# Patient Record
Sex: MALE | Race: WHITE | Employment: FULL TIME | URBAN - METROPOLITAN AREA
[De-identification: names, ages, dates, MRNs, and addresses within clinical notes are randomized per-mention and may not be internally consistent; named-entity substitution may affect disease eponyms.]

---

## 2019-04-28 ENCOUNTER — APPOINTMENT (OUTPATIENT)
Dept: GENERAL RADIOLOGY | Age: 39
End: 2019-04-28
Payer: COMMERCIAL

## 2019-04-28 ENCOUNTER — HOSPITAL ENCOUNTER (EMERGENCY)
Age: 39
Discharge: HOME | End: 2019-04-28
Payer: COMMERCIAL

## 2019-04-28 VITALS
RESPIRATION RATE: 18 BRPM | TEMPERATURE: 97.4 F | HEART RATE: 94 BPM | OXYGEN SATURATION: 97 % | DIASTOLIC BLOOD PRESSURE: 87 MMHG | SYSTOLIC BLOOD PRESSURE: 144 MMHG

## 2019-04-28 DIAGNOSIS — S61.002A AVULSION OF SKIN OF LEFT THUMB, INITIAL ENCOUNTER: Primary | ICD-10-CM

## 2019-04-28 PROCEDURE — 90471 IMMUNIZATION ADMIN: CPT | Performed by: NURSE PRACTITIONER

## 2019-04-28 PROCEDURE — 6370000000 HC RX 637 (ALT 250 FOR IP): Performed by: NURSE PRACTITIONER

## 2019-04-28 PROCEDURE — 6360000002 HC RX W HCPCS: Performed by: NURSE PRACTITIONER

## 2019-04-28 PROCEDURE — 73130 X-RAY EXAM OF HAND: CPT

## 2019-04-28 PROCEDURE — 90715 TDAP VACCINE 7 YRS/> IM: CPT | Performed by: NURSE PRACTITIONER

## 2019-04-28 PROCEDURE — 99282 EMERGENCY DEPT VISIT SF MDM: CPT

## 2019-04-28 RX ORDER — DOXYCYCLINE 100 MG/1
100 CAPSULE ORAL 2 TIMES DAILY
Qty: 14 CAPSULE | Refills: 0 | Status: SHIPPED | OUTPATIENT
Start: 2019-04-28 | End: 2019-05-05

## 2019-04-28 RX ORDER — NAPROXEN 500 MG/1
500 TABLET ORAL ONCE
Status: COMPLETED | OUTPATIENT
Start: 2019-04-28 | End: 2019-04-28

## 2019-04-28 RX ORDER — NAPROXEN 500 MG/1
500 TABLET ORAL 2 TIMES DAILY PRN
Qty: 28 TABLET | Refills: 0 | Status: SHIPPED | OUTPATIENT
Start: 2019-04-28

## 2019-04-28 RX ADMIN — TETANUS TOXOID, REDUCED DIPHTHERIA TOXOID AND ACELLULAR PERTUSSIS VACCINE, ADSORBED 0.5 ML: 5; 2.5; 8; 8; 2.5 SUSPENSION INTRAMUSCULAR at 19:52

## 2019-04-28 RX ADMIN — NAPROXEN 500 MG: 500 TABLET ORAL at 19:52

## 2019-04-28 ASSESSMENT — PAIN DESCRIPTION - ORIENTATION: ORIENTATION: LEFT

## 2019-04-28 ASSESSMENT — PAIN SCALES - GENERAL
PAINLEVEL_OUTOF10: 4
PAINLEVEL_OUTOF10: 4

## 2019-04-28 ASSESSMENT — PAIN DESCRIPTION - PAIN TYPE: TYPE: ACUTE PAIN

## 2019-04-28 ASSESSMENT — PAIN DESCRIPTION - LOCATION: LOCATION: HAND

## 2019-04-28 NOTE — ED PROVIDER NOTES
[facility]      HPI:  4/28/19,   Time: 7:22 PM         Serafin Quevedo is a 45 y.o. male presenting to the ED for left thumb laceration, beginning few hours ago. The complaint has been constant, mild in severity, and worsened by nothing. 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 Continues to have oozing from the tip of the thumb. He is uncertain of his tetanus status. He is right-hand dominant. ROS:   Pertinent positives and negatives are stated within HPI, all other systems reviewed and are negative.  --------------------------------------------- PAST HISTORY ---------------------------------------------  Past Medical History:  has no past medical history on file. Past Surgical History:  has no past surgical history on file. Social History:  reports that he has never smoked. He has never used smokeless tobacco. He reports that he drank alcohol. He reports that he does not use drugs. Family History: family history is not on file. The patients home medications have been reviewed. Allergies: Pcn [penicillins]    -------------------------------------------------- RESULTS -------------------------------------------------  All laboratory and radiology results have been personally reviewed by myself   LABS:  No results found for this visit on 04/28/19. RADIOLOGY:  Interpreted by Radiologist.  XR HAND LEFT (MIN 3 VIEWS)    (Results Pending)       ------------------------- NURSING NOTES AND VITALS REVIEWED ---------------------------   The nursing notes within the ED encounter and vital signs as below have been reviewed.    BP (!) 144/87   Pulse 94   Temp 97.4 °F (36.3 °C) (Temporal)   Resp 18   SpO2 97%   Oxygen Saturation Interpretation: Normal      ---------------------------------------------------PHYSICAL EXAM--------------------------------------      Constitutional/General: Alert and oriented x3, well appearing, non toxic in NAD  Head: NC/AT  Eyes: PERRL, EOMI  Mouth: Oropharynx clear, handling secretions, no trismus  Neck: Supple, full ROM, no meningeal signs  Pulmonary: Lungs clear to auscultation bilaterally, no wheezes, rales, or rhonchi. Not in respiratory distress  Cardiovascular:  Regular rate and rhythm, no murmurs, gallops, or rubs. 2+ distal pulses  Abdomen: Soft, non tender, non distended,   Extremities: Moves all extremities x 4. Warm and well perfused, avulsion of the distal tip of the left thumb. Continues to have oozing from the site. Skin: warm and dry without rash  Neurologic: GCS 15,  Psych: Normal Affect      ------------------------------ ED COURSE/MEDICAL DECISION MAKING----------------------  Medications   naproxen (NAPROSYN) tablet 500 mg (500 mg Oral Given 4/28/19 1952)   Tetanus-Diphth-Acell Pertussis (BOOSTRIX) injection 0.5 mL (0.5 mLs Intramuscular Given 4/28/19 1952)         Medical Decision Making:    Quick clot powder was applied to the avulsion of the distal tip of the left thumb and complete hemostasis was obtained. Dressing was applied. There was a splint applied over the tip of the left thumb. The medics and pain medication provided and advised to follow up with PCP if any further problems. Counseling: The emergency provider has spoken with the patient and discussed todays results, in addition to providing specific details for the plan of care and counseling regarding the diagnosis and prognosis. Questions are answered at this time and they are agreeable with the plan.      --------------------------------- IMPRESSION AND DISPOSITION ---------------------------------    IMPRESSION  1.  Avulsion of skin of left thumb, initial encounter        DISPOSITION  Disposition: Discharge to home  Patient condition is good                 Kathy Tipton, SHEBA - CNP  04/28/19 2030

## 2020-07-03 ENCOUNTER — HOSPITAL ENCOUNTER (OUTPATIENT)
Age: 40
Setting detail: OBSERVATION
Discharge: HOME | End: 2020-07-04
Attending: EMERGENCY MEDICINE | Admitting: ORTHOPAEDIC SURGERY
Payer: COMMERCIAL

## 2020-07-03 PROBLEM — S69.80XA: Status: ACTIVE | Noted: 2020-07-03

## 2020-07-03 LAB
ANION GAP SERPL CALCULATED.3IONS-SCNC: 9 MMOL/L (ref 7–16)
BASOPHILS ABSOLUTE: 0.03 E9/L (ref 0–0.2)
BASOPHILS RELATIVE PERCENT: 0.4 % (ref 0–2)
BUN BLDV-MCNC: 10 MG/DL (ref 6–20)
CALCIUM SERPL-MCNC: 8.4 MG/DL (ref 8.6–10.2)
CHLORIDE BLD-SCNC: 108 MMOL/L (ref 98–107)
CO2: 25 MMOL/L (ref 22–29)
CREAT SERPL-MCNC: 0.9 MG/DL (ref 0.7–1.2)
EOSINOPHILS ABSOLUTE: 0.26 E9/L (ref 0.05–0.5)
EOSINOPHILS RELATIVE PERCENT: 3.3 % (ref 0–6)
GFR AFRICAN AMERICAN: >60
GFR NON-AFRICAN AMERICAN: >60 ML/MIN/1.73
GLUCOSE BLD-MCNC: 102 MG/DL (ref 74–99)
HCT VFR BLD CALC: 44.3 % (ref 37–54)
HEMOGLOBIN: 14.7 G/DL (ref 12.5–16.5)
IMMATURE GRANULOCYTES #: 0.03 E9/L
IMMATURE GRANULOCYTES %: 0.4 % (ref 0–5)
INR BLD: 1
LYMPHOCYTES ABSOLUTE: 1.55 E9/L (ref 1.5–4)
LYMPHOCYTES RELATIVE PERCENT: 19.8 % (ref 20–42)
MCH RBC QN AUTO: 28.9 PG (ref 26–35)
MCHC RBC AUTO-ENTMCNC: 33.2 % (ref 32–34.5)
MCV RBC AUTO: 87 FL (ref 80–99.9)
MONOCYTES ABSOLUTE: 0.69 E9/L (ref 0.1–0.95)
MONOCYTES RELATIVE PERCENT: 8.8 % (ref 2–12)
NEUTROPHILS ABSOLUTE: 5.26 E9/L (ref 1.8–7.3)
NEUTROPHILS RELATIVE PERCENT: 67.3 % (ref 43–80)
PDW BLD-RTO: 13 FL (ref 11.5–15)
PLATELET # BLD: 192 E9/L (ref 130–450)
PMV BLD AUTO: 9.4 FL (ref 7–12)
POTASSIUM REFLEX MAGNESIUM: 3.8 MMOL/L (ref 3.5–5)
PROTHROMBIN TIME: 11 SEC (ref 9.3–12.4)
RBC # BLD: 5.09 E12/L (ref 3.8–5.8)
SODIUM BLD-SCNC: 142 MMOL/L (ref 132–146)
WBC # BLD: 7.8 E9/L (ref 4.5–11.5)

## 2020-07-03 PROCEDURE — 85610 PROTHROMBIN TIME: CPT

## 2020-07-03 PROCEDURE — G0378 HOSPITAL OBSERVATION PER HR: HCPCS

## 2020-07-03 PROCEDURE — 85025 COMPLETE CBC W/AUTO DIFF WBC: CPT

## 2020-07-03 PROCEDURE — 96375 TX/PRO/DX INJ NEW DRUG ADDON: CPT

## 2020-07-03 PROCEDURE — 2500000003 HC RX 250 WO HCPCS: Performed by: EMERGENCY MEDICINE

## 2020-07-03 PROCEDURE — 6360000002 HC RX W HCPCS: Performed by: EMERGENCY MEDICINE

## 2020-07-03 PROCEDURE — 99220 PR INITIAL OBSERVATION CARE/DAY 70 MINUTES: CPT | Performed by: ORTHOPAEDIC SURGERY

## 2020-07-03 PROCEDURE — 96374 THER/PROPH/DIAG INJ IV PUSH: CPT

## 2020-07-03 PROCEDURE — 99284 EMERGENCY DEPT VISIT MOD MDM: CPT

## 2020-07-03 PROCEDURE — 80048 BASIC METABOLIC PNL TOTAL CA: CPT

## 2020-07-03 RX ORDER — CLINDAMYCIN PHOSPHATE 600 MG/50ML
600 INJECTION INTRAVENOUS ONCE
Status: COMPLETED | OUTPATIENT
Start: 2020-07-03 | End: 2020-07-03

## 2020-07-03 RX ORDER — OXYCODONE HYDROCHLORIDE AND ACETAMINOPHEN 5; 325 MG/1; MG/1
1 TABLET ORAL EVERY 4 HOURS PRN
Status: DISCONTINUED | OUTPATIENT
Start: 2020-07-03 | End: 2020-07-04 | Stop reason: HOSPADM

## 2020-07-03 RX ORDER — FENTANYL CITRATE 50 UG/ML
50 INJECTION, SOLUTION INTRAMUSCULAR; INTRAVENOUS ONCE
Status: COMPLETED | OUTPATIENT
Start: 2020-07-03 | End: 2020-07-03

## 2020-07-03 RX ORDER — MORPHINE SULFATE 4 MG/ML
4 INJECTION, SOLUTION INTRAMUSCULAR; INTRAVENOUS EVERY 4 HOURS PRN
Status: DISCONTINUED | OUTPATIENT
Start: 2020-07-03 | End: 2020-07-04 | Stop reason: HOSPADM

## 2020-07-03 RX ORDER — ONDANSETRON 2 MG/ML
4 INJECTION INTRAMUSCULAR; INTRAVENOUS ONCE
Status: COMPLETED | OUTPATIENT
Start: 2020-07-03 | End: 2020-07-03

## 2020-07-03 RX ORDER — MORPHINE SULFATE 2 MG/ML
2 INJECTION, SOLUTION INTRAMUSCULAR; INTRAVENOUS EVERY 4 HOURS PRN
Status: DISCONTINUED | OUTPATIENT
Start: 2020-07-03 | End: 2020-07-04 | Stop reason: HOSPADM

## 2020-07-03 RX ADMIN — CLINDAMYCIN PHOSPHATE 600 MG: 600 INJECTION, SOLUTION INTRAVENOUS at 18:44

## 2020-07-03 RX ADMIN — ONDANSETRON HYDROCHLORIDE 4 MG: 2 SOLUTION INTRAMUSCULAR; INTRAVENOUS at 18:30

## 2020-07-03 RX ADMIN — FENTANYL CITRATE 50 MCG: 0.05 INJECTION, SOLUTION INTRAMUSCULAR; INTRAVENOUS at 18:30

## 2020-07-03 ASSESSMENT — PAIN DESCRIPTION - ONSET: ONSET: ON-GOING

## 2020-07-03 ASSESSMENT — PAIN DESCRIPTION - PROGRESSION: CLINICAL_PROGRESSION: NOT CHANGED

## 2020-07-03 ASSESSMENT — PAIN DESCRIPTION - ORIENTATION: ORIENTATION: LEFT

## 2020-07-03 ASSESSMENT — PAIN DESCRIPTION - LOCATION: LOCATION: HAND

## 2020-07-03 ASSESSMENT — PAIN SCALES - GENERAL
PAINLEVEL_OUTOF10: 6
PAINLEVEL_OUTOF10: 8
PAINLEVEL_OUTOF10: 10

## 2020-07-03 ASSESSMENT — PAIN DESCRIPTION - FREQUENCY: FREQUENCY: CONTINUOUS

## 2020-07-03 ASSESSMENT — PAIN - FUNCTIONAL ASSESSMENT: PAIN_FUNCTIONAL_ASSESSMENT: PREVENTS OR INTERFERES SOME ACTIVE ACTIVITIES AND ADLS

## 2020-07-03 ASSESSMENT — PAIN DESCRIPTION - PAIN TYPE: TYPE: ACUTE PAIN

## 2020-07-03 ASSESSMENT — PAIN DESCRIPTION - DESCRIPTORS: DESCRIPTORS: CONSTANT;THROBBING

## 2020-07-03 NOTE — ED PROVIDER NOTES
HPI:  7/3/20,   Time: 6:06 PM EDT       Izabela Medley is a 36 y.o. male presenting to the ED for left hand/pinky injury, beginning 2 days ago. The complaint has been persistent, moderate in severity, and worsened by movement of finger. High pressure injury to finger 2 days ago, now swollen, red, painful to move. No fevers, no n/v    Review of Systems:   Pertinent positives and negatives are stated within HPI, all other systems reviewed and are negative.          --------------------------------------------- PAST HISTORY ---------------------------------------------  Past Medical History:  has no past medical history on file. Past Surgical History:  has no past surgical history on file. Social History:  reports that he has never smoked. He has never used smokeless tobacco. He reports previous alcohol use. He reports that he does not use drugs. Family History: family history is not on file. The patients home medications have been reviewed. Allergies: Pcn [penicillins]        ---------------------------------------------------PHYSICAL EXAM--------------------------------------    Constitutional/General: Alert and oriented x3, well appearing, non toxic in NAD  Head: Normocephalic and atraumatic    Cardiovascular:  Regular rate. Regular rhythm. No murmurs, gallops, or rubs. 2+ distal pulses  Chest: No chest wall tenderness    Musculoskeletal:   Skin defect of medial proximal left 5th digit with diffuse swelling of digit and dorsal aspect of hand, finger held in passive flexion, ttp along flexor tendon if digit. Integument: skin warm and dry. No rashes.    Lymphatic: no lymphadenopathy noted  Neurologic: GCS 15, no focal deficits, symmetric strength 5/5 in the upper and lower extremities bilaterally  Psychiatric: Normal Affect    -------------------------------------------------- RESULTS -------------------------------------------------  I have personally reviewed all laboratory and imaging results for this patient. Results are listed below. LABS:  Results for orders placed or performed during the hospital encounter of 07/03/20   CBC Auto Differential   Result Value Ref Range    WBC 7.8 4.5 - 11.5 E9/L    RBC 5.09 3.80 - 5.80 E12/L    Hemoglobin 14.7 12.5 - 16.5 g/dL    Hematocrit 44.3 37.0 - 54.0 %    MCV 87.0 80.0 - 99.9 fL    MCH 28.9 26.0 - 35.0 pg    MCHC 33.2 32.0 - 34.5 %    RDW 13.0 11.5 - 15.0 fL    Platelets 587 624 - 709 E9/L    MPV 9.4 7.0 - 12.0 fL    Neutrophils % 67.3 43.0 - 80.0 %    Immature Granulocytes % 0.4 0.0 - 5.0 %    Lymphocytes % 19.8 (L) 20.0 - 42.0 %    Monocytes % 8.8 2.0 - 12.0 %    Eosinophils % 3.3 0.0 - 6.0 %    Basophils % 0.4 0.0 - 2.0 %    Neutrophils Absolute 5.26 1.80 - 7.30 E9/L    Immature Granulocytes # 0.03 E9/L    Lymphocytes Absolute 1.55 1.50 - 4.00 E9/L    Monocytes Absolute 0.69 0.10 - 0.95 E9/L    Eosinophils Absolute 0.26 0.05 - 0.50 E9/L    Basophils Absolute 0.03 0.00 - 0.20 C0/T   Basic Metabolic Panel w/ Reflex to MG   Result Value Ref Range    Sodium 142 132 - 146 mmol/L    Potassium reflex Magnesium 3.8 3.5 - 5.0 mmol/L    Chloride 108 (H) 98 - 107 mmol/L    CO2 25 22 - 29 mmol/L    Anion Gap 9 7 - 16 mmol/L    Glucose 102 (H) 74 - 99 mg/dL    BUN 10 6 - 20 mg/dL    CREATININE 0.9 0.7 - 1.2 mg/dL    GFR Non-African American >60 >=60 mL/min/1.73    GFR African American >60     Calcium 8.4 (L) 8.6 - 10.2 mg/dL   Protime-INR   Result Value Ref Range    Protime 11.0 9.3 - 12.4 sec    INR 1.0        RADIOLOGY:  Interpreted by Radiologist.  No orders to display       EKG: This EKG is signed and interpreted by the EP. Time:   Rate:   Rhythm:   Interpretation:   Comparison:       ------------------------- NURSING NOTES AND VITALS REVIEWED ---------------------------   The nursing notes within the ED encounter and vital signs as below have been reviewed by myself.   BP (!) 156/81   Pulse 97   Temp 98.7 °F (37.1 °C)   Resp 18   SpO2 96%   Oxygen Saturation Interpretation: Normal    The patients available past medical records and past encounters were reviewed. ------------------------------ ED COURSE/MEDICAL DECISION MAKING----------------------  Medications   fentaNYL (SUBLIMAZE) injection 50 mcg (50 mcg Intravenous Given 7/3/20 1830)   ondansetron (ZOFRAN) injection 4 mg (4 mg Intravenous Given 7/3/20 1830)   clindamycin (CLEOCIN) 600 mg in dextrose 5 % 50 mL IVPB (600 mg Intravenous New Bag 7/3/20 1844)         ED COURSE:       Medical Decision Making:    Ortho consulted, will admit for iv abx and or in am      This patient's ED course included: a personal history and physicial examination    This patient has remained hemodynamically stable during their ED course. Re-Evaluations:             Re-evaluation. Patients symptoms show no change    Re-examination  7/3/20   6:06 PM EDT          Vital Signs:   Vitals:    07/03/20 1738 07/03/20 1753   BP:  (!) 156/81   Pulse: 97    Resp:  18   Temp: 98.7 °F (37.1 °C)    SpO2: 96%      Card/Pulm:  Rhythm: normal rate. Heart Sounds: no murmurs, gallops, or rubs. clear to auscultation, no wheezes or rales and unlabored breathing. Capillary Refill: normal.  Radial Pulse:  equal.  Skin:  Warm. Consultations:             ortho    Critical Care:         Counseling: The emergency provider has spoken with the patient and discussed todays results, in addition to providing specific details for the plan of care and counseling regarding the diagnosis and prognosis. Questions are answered at this time and they are agreeable with the plan.       --------------------------------- IMPRESSION AND DISPOSITION ---------------------------------    IMPRESSION  1. High-pressure injection injury of finger of right hand, initial encounter    2.  Flexor tenosynovitis of finger        DISPOSITION  Disposition: Admit to med/surg floor  Patient condition is fair    NOTE: This report was transcribed using voice recognition software.  Every effort was made to ensure accuracy; however, inadvertent computerized transcription errors may be present        Dwight Koyanagi, MD  07/03/20 2017

## 2020-07-03 NOTE — ED NOTES
Bed: 09  Expected date:   Expected time:   Means of arrival:   Comments:     Yahaira Choe RN  07/03/20 3993

## 2020-07-04 VITALS
HEIGHT: 74 IN | TEMPERATURE: 97.5 F | DIASTOLIC BLOOD PRESSURE: 89 MMHG | SYSTOLIC BLOOD PRESSURE: 137 MMHG | WEIGHT: 200 LBS | BODY MASS INDEX: 25.67 KG/M2 | OXYGEN SATURATION: 95 % | RESPIRATION RATE: 18 BRPM | HEART RATE: 77 BPM

## 2020-07-04 PROCEDURE — 6360000002 HC RX W HCPCS: Performed by: STUDENT IN AN ORGANIZED HEALTH CARE EDUCATION/TRAINING PROGRAM

## 2020-07-04 PROCEDURE — G0378 HOSPITAL OBSERVATION PER HR: HCPCS

## 2020-07-04 PROCEDURE — 6370000000 HC RX 637 (ALT 250 FOR IP): Performed by: STUDENT IN AN ORGANIZED HEALTH CARE EDUCATION/TRAINING PROGRAM

## 2020-07-04 PROCEDURE — 2580000003 HC RX 258: Performed by: STUDENT IN AN ORGANIZED HEALTH CARE EDUCATION/TRAINING PROGRAM

## 2020-07-04 RX ORDER — SULFAMETHOXAZOLE AND TRIMETHOPRIM 800; 160 MG/1; MG/1
1 TABLET ORAL 2 TIMES DAILY
Qty: 20 TABLET | Refills: 0 | Status: SHIPPED | OUTPATIENT
Start: 2020-07-04 | End: 2020-07-14

## 2020-07-04 RX ORDER — ACETAMINOPHEN 650 MG
TABLET, EXTENDED RELEASE ORAL
Status: DISCONTINUED
Start: 2020-07-04 | End: 2020-07-04 | Stop reason: HOSPADM

## 2020-07-04 RX ORDER — LIDOCAINE HYDROCHLORIDE 10 MG/ML
20 INJECTION, SOLUTION INFILTRATION; PERINEURAL ONCE
Status: DISCONTINUED | OUTPATIENT
Start: 2020-07-04 | End: 2020-07-04 | Stop reason: HOSPADM

## 2020-07-04 RX ORDER — OXYCODONE HYDROCHLORIDE AND ACETAMINOPHEN 5; 325 MG/1; MG/1
1 TABLET ORAL EVERY 6 HOURS PRN
Qty: 12 TABLET | Refills: 0 | Status: SHIPPED | OUTPATIENT
Start: 2020-07-04 | End: 2020-07-07

## 2020-07-04 RX ADMIN — PIPERACILLIN AND TAZOBACTAM 3.38 G: 3; .375 INJECTION, POWDER, FOR SOLUTION INTRAVENOUS at 00:45

## 2020-07-04 RX ADMIN — VANCOMYCIN HYDROCHLORIDE 2000 MG: 10 INJECTION, POWDER, LYOPHILIZED, FOR SOLUTION INTRAVENOUS at 00:37

## 2020-07-04 RX ADMIN — OXYCODONE AND ACETAMINOPHEN 1 TABLET: 5; 325 TABLET ORAL at 00:44

## 2020-07-04 RX ADMIN — PIPERACILLIN AND TAZOBACTAM 3.38 G: 3; .375 INJECTION, POWDER, FOR SOLUTION INTRAVENOUS at 08:54

## 2020-07-04 ASSESSMENT — PAIN SCALES - GENERAL: PAINLEVEL_OUTOF10: 6

## 2020-07-04 NOTE — PROGRESS NOTES
Pharmacy Consultation Note  (Antibiotic Dosing and Monitoring)    Initial consult date: 7/4/2-  Consulting physician: Dr. Jessica Uribe  Drug(s): vancomycin   Indication: left finger injury      Age/  Gender Height Weight IBW Dosing weight  Allergy Information   40 y.o./male 6' 2\" (188 cm) 200 lb (90.7 kg)     Ideal body weight: 82.2 kg (181 lb 3.5 oz)  Adjusted ideal body weight: 85.6 kg (188 lb 11.7 oz)  85.6 kg  Pcn [penicillins]          Other anti-infectives Start date Stop date   Zosyn 7/4                     Date  Tmax WBC BUN/CR UOP CrCL  (mL/min) Drug/Dose Time   Given Level(s)   (Time) Comments   7/4 afebrile 7.8 10/0.9 -- >120 Vancomycin 2000 mg IV x 1 dose    Vancomyci 1250 mg Iv q 8 hr 0037      (1230)  (2030)            (0430) Vanco trough at 0400                                No intake or output data in the 24 hours ending 07/04/20 1157    Average urine output:    Cultures:    Site Date Result                    No results for input(s): Dahiana Forrester in the last 72 hours. Historical Cultures:  No results found for: ORG  No results for input(s): BC in the last 72 hours. Radiology:      Assessment:  · 36 yom who presented to the ED for left small finger injury. Patient placed on empiric therapy of vancomycin and zosyn.   · Goal trough = 15 - 20 mcg/ml  · Scr 0.9    Plan:  · Vancomycin 1250 mg IV q 8 hr  · A vanco trough level will be obtained when steady-state is reached  · Pharmacist will follow and monitor/adjust dosing as necessary    Ministerio Soler, PharmD, BCPS 7/4/2020 11:57 AM   Phone: [phone]

## 2020-07-04 NOTE — H&P
Temp 98.7 °F (37.1 °C)   Resp 18   SpO2 96%   CONSTITUTIONAL:  Awake, alert, answers questions appropriately  EYES:  Lids and lashes normal, pupils equal, round and reactive to light, extra ocular muscles intact  HENT:  Normocephalic, without obvious abnormality, atraumatic, neck supple, symmetric  LUNGS:  No increased work of breathing  CARDIOVASCULAR:  Brisk vascular capillary refill to all extremities  ABDOMEN:  Non-tender, Non-distended  NEUROLOGIC:  Awake, alert, oriented to name, place and time. Cranial nerves II-XII are grossly intact.   MUSCULOSKELETAL:  Left upper Extremity:   · Puncture/laceration to proximal radial aspect of small finger  · There is erythema surrounding extending just proximal to the MCP palmarly and dorsally  · No active drainage from the wound  · Mild swelling about the wound and MCP joint  · No swelling distally to the finger  · No swelling throughout the palm  · Nontender to the flexor surface of the small finger except over MCP and proximal phalanx  · No focal area of fluctuance  · Sensation intact to light touch distally to the small finger and ring finger  · Brisk capillary refill present to ring finger and small finger  · Demonstrates ability to flex and extend at MCP/PIP/DIP joints  · No injuries to other fingers        DATA:    CBC:   Lab Results   Component Value Date    WBC 7.8 07/03/2020    RBC 5.09 07/03/2020    HGB 14.7 07/03/2020    HCT 44.3 07/03/2020    MCV 87.0 07/03/2020    MCH 28.9 07/03/2020    MCHC 33.2 07/03/2020    RDW 13.0 07/03/2020     07/03/2020    MPV 9.4 07/03/2020     PT/INR:    Lab Results   Component Value Date    PROTIME 11.0 07/03/2020    INR 1.0 07/03/2020     Radiology Review:    · XR hand left from outside facility:  · Demonstrates no acute fracture dislocation    IMPRESSION:  · Left small finger high-pressure injection injury, 3days old    PLAN:  · Nonweightbearing left upper extremity  · Keep hand elevated and ice  · We will trial course

## 2020-07-04 NOTE — DISCHARGE INSTR - COC
Continuity of Care Form    Patient Name: Mary Rose   :  1980  MRN:  [id number]    [facility] date:  7/3/2020  Discharge date:  ***    Code Status Order: No Order   Advance Directives:   Advance Care Flowsheet Documentation     Date/Time Healthcare Directive Type of Healthcare Directive Copy in [address] Agent's Name Healthcare Agent's Phone Number    20 2219  No, patient does not have an advance directive for healthcare treatment -- -- -- -- --          Admitting Physician:  Aaron Jordan DO  PCP: No primary care provider on file. Discharging Nurse: [facility] Unit/Room#: 9904/4363-V  Discharging Unit Phone Number: ***    Emergency Contact:   Extended Emergency Contact Information  Primary Emergency Contact: Jacky Shaffer4 Phone: [phone]  Relation: Parent   needed? No    Past Surgical History:  No past surgical history on file.     Immunization History:   Immunization History   Administered Date(s) Administered    Tdap (Boostrix, Adacel) 2019       Active Problems:  Patient Active Problem List   Diagnosis Code    High pressure injury of hand S69.80XA       Isolation/Infection:   Isolation          No Isolation        Patient Infection Status     None to display          Nurse Assessment:  Last Vital Signs: /89   Pulse 77   Temp 97.5 °F (36.4 °C) (Temporal)   Resp 18   Ht 6' 2\" (1.88 m)   Wt 200 lb (90.7 kg)   SpO2 95%   BMI 25.68 kg/m²     Last documented pain score (0-10 scale): Pain Level: 6  Last Weight:   Wt Readings from Last 1 Encounters:   20 200 lb (90.7 kg)     Mental Status:  {IP PT MENTAL STATUS:}    IV Access:  { RONALD IV ACCESS:504789796}    Nursing Mobility/ADLs:  Walking   {CHP DME JUSV:438562467}  Transfer  {CHP DME XWBF:775779603}  Bathing  {CHP DME UJXL:865972420}  Dressing  {CHP DME MIFE:397134994}  Toileting  {CHP DME DULN:106037665}  Feeding  {CHP DME MEHW:154644624}  Med Admin  {[facility] DME [facility]:384458090}  Med Delivery   { RONALD MED Delivery:472269283}    Wound Care Documentation and Therapy:  Wound Finger (Comment which one) Anterior; Left high pressure blast penetrating at web between 5th and 4th phalangee (Active)   Drainage Amount None 2020  8:00 AM   Number of days:         Elimination:  Continence:   · Bowel: {YES / LQ:37242}  · Bladder: {YES / IK:65461}  Urinary Catheter: {Urinary Catheter:822796541}   Colostomy/Ileostomy/Ileal Conduit: {YES / J}       Date of Last BM: ***  No intake or output data in the 24 hours ending 20 1410  No intake/output data recorded.     Safety Concerns:     [employer] Safety Concerns:258253050}    Impairments/Disabilities:      [employer] Impairments/Disabilities:818715146}    Nutrition Therapy:  Current Nutrition Therapy:   [employer] Diet List:353092444}    Routes of Feeding: {[facility] DME Other Feedings:258947588}  Liquids: {Slp liquid thickness:78160}  Daily Fluid Restriction: {[facility] DME Yes amt example:985507293}  Last Modified Barium Swallow with Video (Video Swallowing Test): {Done Not Done MDMU:360921025}    Treatments at the Time of Hospital Discharge:   Respiratory Treatments: ***  Oxygen Therapy:  {Therapy; copd oxygen:57569}  Ventilator:    { CC Vent UECO:078307611}    Rehab Therapies: {THERAPEUTIC INTERVENTION:1948698812}  Weight Bearing Status/Restrictions: [employer] Weight Bearin}  Other Medical Equipment (for information only, NOT a DME order):  {EQUIPMENT:480660129}  Other Treatments: ***    Patient's personal belongings (please select all that are sent with patient):  {[facility] DME Belongings:256498058}    RN SIGNATURE:  {Esignature:530244155}    CASE MANAGEMENT/SOCIAL WORK SECTION    Inpatient Status Date: ***    Readmission Risk Assessment Score:  Readmission Risk              Risk of Unplanned Readmission:        0           Discharging to Facility/ Agency   · Name:   · Address:  · Phone:  · Fax:    Dialysis Facility (if applicable)   · Name:  · Address:  · Dialysis Schedule:  · Phone:  · Fax:    / signature: {Esignature:246848150}    PHYSICIAN SECTION    Prognosis: {Prognosis:0596841753}    Condition at Discharge: 50Yasmine Pleitez Patient Condition:884255270}    Rehab Potential (if transferring to Rehab): {Prognosis:1054147878}    Recommended Labs or Other Treatments After Discharge: ***    Physician Certification: I certify the above information and transfer of [place]   is necessary for the continuing treatment of the diagnosis listed and that he requires {Admit to Appropriate Level of Care:67080} for {GREATER/LESS:032878846} 30 days.      Update Admission H&P: {CHP DME Changes in [facility]:234289632}    PHYSICIAN SIGNATURE:  {Esignature:557165656}

## 2020-07-04 NOTE — PROGRESS NOTES
Orthopedic Procedure     LEFT 5th digit incision and drainage      Consent was obtained from the patient and patient was properly anesthetized     Patient was prepped and draped in sterile fashion    A 1 cm incision was made over the dorsal aspect of the digit between the MCPJ and PIPJ    Blunt dissection was performed with hemostats superficially and fluid was expressed from the incision     An additional small 0.5 incision was made to the radial and dorsal aspect of the digit and a small amount of purulent fluid was expressed    Both incision sites were irrigated copiously with saline    The dorsal incision was loosely approximated with a single 3-0 Ethilon suture    A xeroform, webril, and kerlix dressing was applied to the hand    Patient tolerated the procedure well and was neurovascularly intact following the procedure

## 2020-07-04 NOTE — PROGRESS NOTES
Department of Orthopedic Surgery  Resident Progress Note    Patient seen and examined. Pain controlled. No new complaints. Denies chest pain, shortness of breath, dizziness/lightheadedness. States that his hand remains the same. VITALS:  BP (!) 140/80   Pulse 70   Temp 97.1 °F (36.2 °C) (Temporal)   Resp 18   Ht 6' 2\" (1.88 m)   Wt 200 lb (90.7 kg)   SpO2 96%   BMI 25.68 kg/m²     General: alert and oriented to person, place and time, well-developed and well-nourished, in no acute distress    MUSCULOSKELETAL:   left upper extremity:  · Puncture/laceration to proximal radial aspect of small finger  · There is erythema surrounding extending just proximal to the MCP palmarly and dorsally  · No active drainage from the wound  · Mild swelling about the wound and MCP joint  · Mild swelling distally to the finger  · No swelling throughout the palm  · Nontender to the flexor surface of the small finger except over MCP and proximal phalanx  · No focal area of fluctuance  · Sensation intact to light touch distally to the small finger and ring finger  · Brisk capillary refill present to ring finger and small finger    CBC:   Lab Results   Component Value Date    WBC 7.8 07/03/2020    HGB 14.7 07/03/2020    HCT 44.3 07/03/2020     07/03/2020     PT/INR:    Lab Results   Component Value Date    PROTIME 11.0 07/03/2020    INR 1.0 07/03/2020         ASSESSMENT  Left small finger high-pressure injection injury    PLAN      · Nonweightbearing left upper extremity  · Continue antibiotics  · Keep hand elevated  · We will consider I&D later today of left hand  · Remain n.p.o.  · Discussed with Dr. Dorys Sahu Attending    I have seen and evaluated the patient with the resident and agree with the above assessments on today's visit. I have performed the key components of the history and physical examination and concur completely with the findings and plans as documented above.     Patient with considerable improvement overnight on IV antibiotics. Does have some more focal region of induration overlying the zone of injury and onto the dorsum of the small finger proximal phalanx region. Clinically patient still able to actively ROM to the MCP and PIP joint without any discomfort. Do not suspect any obvious septic arthritis at this time but rather early abscess formation. .  Discussed with patient recommendations for bedside I&D which he is agreeable to. We will perform this at bedside transition patient to oral antibiotics. Patient also eager to be discharged home. Had lengthy discussion with him regarding signs or symptoms concerning for infection and if this were the case he needs to return to the ER immediately for repeat evaluation which he understands and verbalizes agreement for.     Electronically signed by   Rosa Jonas DO  7/4/2020

## 2020-07-04 NOTE — PROGRESS NOTES
Pharmacy Consultation Note  (Antibiotic Dosing and Monitoring)    Initial consult date: 7-4-20  Consulting physician: Bailey Bishop  Drug: Vancomycin  Indication:     Age/  Gender Height Weight IBW Dosing weight  Allergy Information   40 y.o./male @FLOW(11:first:1)@ @FLOW[14:FIRST:1@     Ideal body weight: 82.2 kg (181 lb 3.5 oz)  Adjusted ideal body weight: 85.6 kg (188 lb 11.7 oz)  90.7  Pcn [penicillins]      @TMAX(24)@        Date  WBC BUN SCr CrCl  (mL/min) Drug/Dose Time   Given Level(s)   (Time) Comments       127 Vancomycin 2000 mg IV                                            No intake or output data in the 24 hours ending 07/04/20 0010    Historical Cultures:  No results found for: ORG  No results for input(s): BC in the last 72 hours. Cultures:  available culture and sensitivity results were reviewed in EPIC    Assessment:  36 y.o. male has been initiated Vancomycin. Estimated CrCl = 127 mL/min  Goal trough level = 15-20 mcg/mL    Plan:   Will initiate vancomycin at a dose of 2000mg ONCE  Monitor renal function   Clinical pharmacy to follow      Maureen Oconnor [facility] 7/4/2020 12:10 AM

## 2020-07-07 ENCOUNTER — TELEPHONE (OUTPATIENT)
Dept: ADMINISTRATIVE | Age: 40
End: 2020-07-07

## 2020-07-07 NOTE — TELEPHONE ENCOUNTER
Per H&P:    \"HPI: 66-year-old male who presented to the ER for evaluation for left hand pain. States he injured the hand approximately 2 days ago he was using a powered paint sprayer to sustain and as he was cleaning the spray out and flushing it with water mineral spirits he went to disconnect this and had a injection type injury to the fourth inter-webspace of the left hand. Had mild pain therefore did not seek immediate care. Over the next 2 days had some progression in his pain along with swelling and redness to the area centered around the centimeter wound over the fourth webspace. Denies any fevers or chills. Was subsequently seen at [facility] ER and referred here for further management. Patient does reside here locally in [facility]. Denies any other injuries or regions of pain. \"    Had bedside I&D done and sent home with PO abx.

## 2020-07-07 NOTE — TELEPHONE ENCOUNTER
Patient called to set up a surgery f/u with Dr. Winston Garcia, he had surgery on 7/4 and was told to f/u this week, he can be reached at [phone].

## 2020-07-08 NOTE — TELEPHONE ENCOUNTER
Call placed to pt, no answer, VM left with callback number. Pt tentatively placed on schedule for Friday 7/10 at 8:30 pending callback.     Future Appointments   Date Time Provider Liliam Montiel   7/10/2020  8:30 AM SCHEDULE, SE ORTHO RES [facility]

## 2020-07-10 ENCOUNTER — OFFICE VISIT (OUTPATIENT)
Dept: ORTHOPEDIC SURGERY | Age: 40
End: 2020-07-10
Payer: COMMERCIAL

## 2020-07-10 VITALS — HEART RATE: 75 BPM | DIASTOLIC BLOOD PRESSURE: 74 MMHG | SYSTOLIC BLOOD PRESSURE: 122 MMHG

## 2020-07-10 PROCEDURE — 99202 OFFICE O/P NEW SF 15 MIN: CPT | Performed by: PHYSICIAN ASSISTANT

## 2020-07-10 NOTE — PROGRESS NOTES
Orthopaedic H&P Note    Jose Salazar is a 36 y.o. male, right hand dominant, who is following up in clinic regarding a high pressure injection injury he sustained to the left small finger 7/3/20. He received IV antibiotics in the hospital and received incision and drainage to that digit during his hospital course. After discharge he has continued to take Bactrim. He states that the suture that was placed to the dorsum of the small finger became loose 1 day prior to his visit and he removed it. His pain is minimal, he has had good range of motion to that extremity, and denies any numbness in that hand. He has no other complaints at this time. Patient Active Problem List    Diagnosis Date Noted    High pressure injury of hand 07/03/2020     Current Outpatient Medications   Medication Sig Dispense Refill    sulfamethoxazole-trimethoprim (BACTRIM DS) 800-160 MG per tablet Take 1 tablet by mouth 2 times daily for 10 days 20 tablet 0    naproxen (NAPROSYN) 500 MG tablet Take 1 tablet by mouth 2 times daily as needed for Pain 28 tablet 0     No current facility-administered medications for this visit. Allergies: Pcn [penicillins]  History reviewed. No pertinent past medical history. History reviewed. No pertinent surgical history. History reviewed. No pertinent family history. Social History     Tobacco Use    Smoking status: Never Smoker    Smokeless tobacco: Never Used   Substance Use Topics    Alcohol use: Not Currently                             Chief Complaint   Patient presents with    ED Follow-up     L small finger puncture wound doi 7/1 s/p I &D       SUBJECTIVE: Jose Salazar is here for initial evaluation for their high-pressure injection injury to the left hand. . States that they had injured it while staining their deck. . DOI: July 1, 2020. Was seen in ER and XRs revealed no acute osseous abnormalities. Denies any other injuries, and no issues with this digit prior to injury.  Denies any numbness or tingling. Pain tolerable currently with medications. Review of Systems   Constitutional: Negative for fever, chills, diaphoresis, appetite change and fatigue. HENT: Negative for dental issues, hearing loss and tinnitus. Negative for congestion, sinus pressure, sneezing, sore throat. Negative for headache. Eyes: Negative for visual disturbance, blurred and double vision. Negative for pain, discharge, redness and itching  Respiratory: Negative for cough, shortness of breath and wheezing. Cardiovascular: Negative for chest pain, palpitations and leg swelling. No dyspnea on exertion   Gastrointestinal:   Negative for nausea, vomiting, abdominal pain, diarrhea, constipation  or black or bloody. Hematologic\Lymphatic:  negative for bleeding, petechiae,   Genitourinary: Negative for hematuria and difficulty urinating. Musculoskeletal: See [facility].  [facility]: See [facility]  Neurological: Negative for dizziness, tremors, seizures, weakness, light-headedness, no TIA or stroke symptoms. No numbness and headaches. Psychiatric/Behavioral: Negative. Physical Examination:   General appearance: alert, well appearing, and in no distress,  normal appearing weight  Mental status: alert, oriented to person, place, and time, normal mood, behavior, speech, dress, motor activity, and thought processes  Abdomen: soft, nondistended, nontender  Resp:   No air hunger, breathing unlabored  Cardiac: distal pulses palpable, skin well perfused. Heart rate regular by palpation radial pulse.   Neurological: alert, oriented X3, normal speech, no focal findings or movement disorder noted, motor and sensory grossly normal bilaterally, normal muscle tone, no tremors, strength 5/5, normal gait and station  HEENT: normochephalic atraumatic, external ears and eyes normal, sclera normal, neck supple  Extremities:   peripheral pulses normal, no edema, redness or tenderness in the calves   Skin: See musculoskeletal exam, no rashes, swelling noted in other extremities  Psych: Affect euthymic       Musculoskeletal:      Examination of the left upper extremity reveals:  - Wound at the radial aspect of the fifth digit and the dorsal surface of the digit between the MCPJ and PIPJ. Evidence of scab formation at the radial aspect. No discharge could be expressed from the site of injury. Incision site on the dorsum of the finger is clean, well approximated. - Good capillary refill to the fifth digit, < 3 seconds  - Sensation intact to both the ulnar and radial side of the fifth digit  - Patient is able to make a composite fist  - Motor function intact to AIN, PIN, radial, ulnar, median nerves  -Sensation intact in the C6-C8 dermatomes    /74 (Site: Right Upper Arm, Position: Sitting, Cuff Size: Medium Adult)   Pulse 75      XR: 7/10/20     X-ray of the left hand revealed no acute osseous abnormality    ASSESSMENT: Status post injection injury to the left small finger with bedside I&D to that digit on 7/4/20    PLAN  - Patient was instructed to finish his course of antibiotics  - Patient is not taking any pain medications at this time  - Patient was instructed to keep the wound clean and dry  - Patient is aware of the need for urgent evaluation if any signs of recurrent infection were to occur, including increased pain, swelling, redness, or discharge to that digit  -Patient was instructed to follow-up as needed    Electronically signed by Milvia Beatty DO on 7/10/2020 at 8:47 AM  Note: This report was completed using computerize voiced recognition software. Every effort has been made to ensure accuracy; however, inadvertent computerized transcription errors may be present. I have seen and evaluated the patient with the resident physician and agree with the above assessments on today's visit.  I have performed the key components of the history and physical examination and concur completely with the findings and plans as documented above.My personal evaluation and documentation is as below:      SUBJECTIVE: Mae Rasheed is a 36 y.o.  male who presents for follow up on left 5th digit high pressure injury. Patient was seen and evaluated in ED, had bedside I&D and was admitted for observation on 7/4. Patient continues with PO antibiotics, states pain significantly improved, swelling and redness markedly improved. Patient has no complaints today on exam.     ROS: All pertinent review of systems are as listed above in HPI and and resident documentation    OBJECTIVE:   Alert and oriented X 3, no acute distress, respirations easy and unlabored with no audible wheezes, skin warm and dry, speech and dress appropriate for noted age, affect euthymic. Extremity:  Left hand exam  · Healing wound over the MCP and proximal phalanx of 5th digit, dorsally. Eschar overlying superficially. No palpable fluctuance, no expressible drainage. No lymphangitic streaking noted  · Compartments soft and compressible  · +AIN/PIN/Ulnar nerve function intact grossly  · +Radial pulse, Brisk Cap refill, hand warm and perfused  · Sensation intact to touch in radial/ulnar/median nerve distributions to hand  · Patient able to make full concentric fist     ASSESSMENT:   Diagnosis Orders   1. High pressure injury of hand         PLAN:  Advised to take all antibiotics  Keep wound clean and dry  Advised on concerning S/S that warrant urgent follow up, patient expressed understanding    Electronically signed by Amanda Sandoval PA-C on 7/12/2020 at 4:59 PM  Note: This report was completed using [employer] voiced recognition software. Every effort has been made to ensure accuracy; however, inadvertent computerized transcription errors may be present.

## 2022-10-06 NOTE — TELEPHONE ENCOUNTER
Pt returned call, able to make appt on 7/10 at 8:30. Directions to office given. Quality 110: Preventive Care And Screening: Influenza Immunization: Influenza Immunization Administered during Influenza season Detail Level: Detailed Patient more tachypneic, O2 sat 86-93 on 4L NC. Patient seems comfortable at rest. Pain medication given. Lungs clear